# Patient Record
Sex: FEMALE | URBAN - NONMETROPOLITAN AREA
[De-identification: names, ages, dates, MRNs, and addresses within clinical notes are randomized per-mention and may not be internally consistent; named-entity substitution may affect disease eponyms.]

---

## 2021-09-23 ENCOUNTER — APPOINTMENT (RX ONLY)
Dept: URBAN - NONMETROPOLITAN AREA CLINIC 13 | Facility: CLINIC | Age: 61
Setting detail: DERMATOLOGY
End: 2021-09-23

## 2021-09-23 DIAGNOSIS — Z41.9 ENCOUNTER FOR PROCEDURE FOR PURPOSES OTHER THAN REMEDYING HEALTH STATE, UNSPECIFIED: ICD-10-CM

## 2021-09-23 PROCEDURE — ? BOTOX

## 2021-09-23 PROCEDURE — ? RESTYLANE REFYNE INJECTION

## 2021-09-23 NOTE — PROCEDURE: RESTYLANE REFYNE INJECTION
Tear Troughs Filler Volume In Cc: 0
Use Map Statement For Sites (Optional): No
Anesthesia Type: 1% lidocaine with epinephrine
Lot #: [id number]
Additional Area 2 Location: perioral
Additional Area 4 Location: ermillion and perioral rhytides
Detail Level: Detailed
Procedural Text: The filler was administered to the treatment areas noted above.
Filler: Restylane Refyne
Additional Area 5 Location: chin,
Expiration Date (Month Year): 07/22
Anesthesia Volume In Cc: 0.5
Additional Area 3 Location: corner of mouth
Additional Anesthesia Volume In Cc: 6
Consent: Written consent obtained. Risks include but not limited to bruising, beading, irregular texture, ulceration, infection, allergic reaction, scar formation, incomplete augmentation, temporary nature, procedural pain.
Nasolabial Folds Filler Volume In Cc: 1
Map Statement: See Attach Map for Complete Details
Additional Area 1 Location: lower face
Post-Care Instructions: Patient instructed to apply ice to reduce swelling.
Price (Use Numbers Only, No Special Characters Or $): 1178

## 2021-09-23 NOTE — PROCEDURE: BOTOX
Show Additional Area 1: Yes
Glabellar Complex Units: 25
Additional Area 2 Units: 0
Show Right And Left Brow Units: No
Consent: Written consent obtained. Risks include but not limited to lid/brow ptosis, bruising, swelling, diplopia, temporary effect, incomplete chemical denervation.
Lot #: 
Expiration Date (Month Year): 11/23
Additional Area 4 Location: under eyes
Additional Area 1 Location: brow,
Post-Care Instructions: Patient instructed to not lie down for 4 hours and limit physical activity for 24 hours. Patient instructed not to travel by airplane for 48 hours.
Additional Area 6 Location: chin
Additional Area 3 Location: left lateral eye 3 Right 4
Detail Level: Zone
Additional Area 5 Location: forhead R side 1 unit.
Inferior Lateral Orbicularis Oculi Units: 15
Dilution (U/0.1 Cc): 1
Price (Use Numbers Only, No Special Characters Or $): 109
Forehead Units: 5
Additional Area 2 Location: upper lip

## 2022-04-06 ENCOUNTER — APPOINTMENT (RX ONLY)
Dept: URBAN - NONMETROPOLITAN AREA CLINIC 13 | Facility: CLINIC | Age: 62
Setting detail: DERMATOLOGY
End: 2022-04-06

## 2022-04-06 DIAGNOSIS — Z41.9 ENCOUNTER FOR PROCEDURE FOR PURPOSES OTHER THAN REMEDYING HEALTH STATE, UNSPECIFIED: ICD-10-CM

## 2022-04-06 PROCEDURE — ? BOTOX

## 2022-04-06 NOTE — PROCEDURE: BOTOX
Additional Area 6 Units: 0
Price (Use Numbers Only, No Special Characters Or $): 777
Show Orbicularis Oculi Units: Yes
Detail Level: Zone
Additional Area 3 Location: [facility] ronaldmilli
Additional Area 2 Location: upper lip
Show Right And Left Brow Units: No
Lot #: [id number]
Inferior Lateral Orbicularis Oculi Units: 15
Additional Area 5 Location: forehead R side peak
Dilution (U/0.1 Cc): 1
Forehead Units: 5
Consent: Written consent obtained. Risks include but not limited to lid/brow ptosis, bruising, swelling, diplopia, temporary effect, incomplete chemical denervation.
Additional Area 4 Location: under lower lash line
Post-Care Instructions: Patient instructed to not lie down for 4 hours and limit physical activity for 24 hours. Patient instructed not to travel by airplane for 48 hours.
Glabellar Complex Units: 25
Expiration Date (Month Year): 5/24
Additional Area 1 Location: brow
Additional Area 6 Location: chin

## 2022-08-03 ENCOUNTER — APPOINTMENT (RX ONLY)
Dept: URBAN - NONMETROPOLITAN AREA CLINIC 13 | Facility: CLINIC | Age: 62
Setting detail: DERMATOLOGY
End: 2022-08-03

## 2022-08-03 DIAGNOSIS — Z41.9 ENCOUNTER FOR PROCEDURE FOR PURPOSES OTHER THAN REMEDYING HEALTH STATE, UNSPECIFIED: ICD-10-CM

## 2022-08-03 PROCEDURE — ? BOTOX

## 2022-08-03 PROCEDURE — ? RESTYLANE REFYNE INJECTION

## 2022-08-03 NOTE — PROCEDURE: RESTYLANE REFYNE INJECTION
Additional Area 4 Location: ermillion and perioral rhytides
Tear Troughs Filler Volume In Cc: 0
Additional Area 2 Location: perioral rhytide
Use Map Statement For Sites (Optional): No
Detail Level: Detailed
Procedural Text: The filler was administered to the treatment areas noted above.
Lot #: [id number]
Nasolabial Folds Filler Volume In Cc: 1
Consent: Written consent obtained. Risks include but not limited to bruising, beading, irregular texture, ulceration, infection, allergic reaction, scar formation, incomplete augmentation, temporary nature, procedural pain.
Anesthesia Type: 1% lidocaine with epinephrine
Filler: Restylane Refyne
Price (Use Numbers Only, No Special Characters Or $): 1200
Additional Area 5 Location: left cheek scar
Additional Area 3 Location: corner of mouth
Additional Area 1 Location: lower face rhytides
Additional Anesthesia Volume In Cc: 6
Expiration Date (Month Year): 2/23
Map Statement: See Attach Map for Complete Details
Post-Care Instructions: Patient instructed to apply ice to reduce swelling.
Anesthesia Volume In Cc: 0.5

## 2022-08-03 NOTE — PROCEDURE: BOTOX
Consent: Written consent obtained. Risks include but not limited to lid/brow ptosis, bruising, swelling, diplopia, temporary effect, incomplete chemical denervation.
Detail Level: Zone
Depressor Anguli Oris Units: 0
Lot #: [id number]
Inferior Lateral Orbicularis Oculi Units: 15
Show Lateral Platysmal Band Units: Yes
Show Lcl Units: No
Additional Area 2 Location: lip
Dilution (U/0.1 Cc): 1
Post-Care Instructions: Patient instructed to not lie down for 4 hours and limit physical activity for 24 hours. Patient instructed not to travel by airplane for 48 hours.
Additional Area 3 Location: chin
Additional Area 5 Location: corner of nose/upper lip area
Additional Area 4 Location: under lower lash lines
Expiration Date (Month Year): 3/24
Additional Area 6 Location: gummy smile
Forehead Units: 5
Price (Use Numbers Only, No Special Characters Or $): 505
Additional Area 1 Location: brow
Glabellar Complex Units: 25